# Patient Record
Sex: FEMALE | Race: BLACK OR AFRICAN AMERICAN
[De-identification: names, ages, dates, MRNs, and addresses within clinical notes are randomized per-mention and may not be internally consistent; named-entity substitution may affect disease eponyms.]

---

## 2018-02-22 ENCOUNTER — HOSPITAL ENCOUNTER (INPATIENT)
Dept: HOSPITAL 54 - ER | Age: 73
LOS: 1 days | Discharge: HOME | DRG: 111 | End: 2018-02-23
Attending: INTERNAL MEDICINE | Admitting: INTERNAL MEDICINE
Payer: COMMERCIAL

## 2018-02-22 VITALS — WEIGHT: 133 LBS | HEIGHT: 60.6 IN | BODY MASS INDEX: 25.44 KG/M2

## 2018-02-22 DIAGNOSIS — Z85.038: ICD-10-CM

## 2018-02-22 DIAGNOSIS — I70.0: ICD-10-CM

## 2018-02-22 DIAGNOSIS — Z79.82: ICD-10-CM

## 2018-02-22 DIAGNOSIS — H81.10: Primary | ICD-10-CM

## 2018-02-22 DIAGNOSIS — Z88.5: ICD-10-CM

## 2018-02-22 DIAGNOSIS — Z88.6: ICD-10-CM

## 2018-02-22 DIAGNOSIS — Z98.890: ICD-10-CM

## 2018-02-22 DIAGNOSIS — Z91.041: ICD-10-CM

## 2018-02-22 DIAGNOSIS — Z93.3: ICD-10-CM

## 2018-02-22 LAB
ALBUMIN SERPL BCP-MCNC: 3.9 G/DL (ref 3.4–5)
ALP SERPL-CCNC: 114 U/L (ref 46–116)
ALT SERPL W P-5'-P-CCNC: 38 U/L (ref 12–78)
APTT PPP: 25 SEC (ref 23–34)
AST SERPL W P-5'-P-CCNC: 24 U/L (ref 15–37)
BASOPHILS # BLD AUTO: 0.1 /CMM (ref 0–0.2)
BASOPHILS NFR BLD AUTO: 1.1 % (ref 0–2)
BILIRUB DIRECT SERPL-MCNC: 0.1 MG/DL (ref 0–0.2)
BILIRUB SERPL-MCNC: 0.4 MG/DL (ref 0.2–1)
BUN SERPL-MCNC: 11 MG/DL (ref 7–18)
CALCIUM SERPL-MCNC: 9.8 MG/DL (ref 8.5–10.1)
CHLORIDE SERPL-SCNC: 105 MMOL/L (ref 98–107)
CO2 SERPL-SCNC: 28 MMOL/L (ref 21–32)
CREAT SERPL-MCNC: 0.9 MG/DL (ref 0.6–1.3)
EOSINOPHIL # BLD AUTO: 0 /CMM (ref 0–0.7)
EOSINOPHIL NFR BLD AUTO: 0.7 % (ref 0–6)
GLUCOSE SERPL-MCNC: 87 MG/DL (ref 74–106)
HCT VFR BLD AUTO: 37 % (ref 33–45)
HGB BLD-MCNC: 12.3 G/DL (ref 11.5–14.8)
INR PPP: 0.91 (ref 0.87–1.13)
LYMPHOCYTES NFR BLD AUTO: 3.3 /CMM (ref 0.8–4.8)
LYMPHOCYTES NFR BLD AUTO: 46 % (ref 20–44)
MCH RBC QN AUTO: 31 PG (ref 26–33)
MCHC RBC AUTO-ENTMCNC: 34 G/DL (ref 31–36)
MCV RBC AUTO: 94 FL (ref 82–100)
MONOCYTES NFR BLD AUTO: 0.3 /CMM (ref 0.1–1.3)
MONOCYTES NFR BLD AUTO: 4.6 % (ref 2–12)
NEUTROPHILS # BLD AUTO: 3.4 /CMM (ref 1.8–8.9)
NEUTROPHILS NFR BLD AUTO: 47.6 % (ref 43–81)
PLATELET # BLD AUTO: 287 /CMM (ref 150–450)
POTASSIUM SERPL-SCNC: 3.9 MMOL/L (ref 3.5–5.1)
PROT SERPL-MCNC: 7.6 G/DL (ref 6.4–8.2)
RBC # BLD AUTO: 3.9 MIL/UL (ref 4–5.2)
RDW COEFFICIENT OF VARIATION: 11.6 (ref 11.5–15)
SODIUM SERPL-SCNC: 143 MMOL/L (ref 136–145)
TROPONIN I SERPL-MCNC: < 0.017 NG/ML (ref 0–0.06)
WBC NRBC COR # BLD AUTO: 7.1 K/UL (ref 4.3–11)

## 2018-02-22 PROCEDURE — A4606 OXYGEN PROBE USED W OXIMETER: HCPCS

## 2018-02-22 PROCEDURE — Z7610: HCPCS

## 2018-02-22 NOTE — NUR
BIBSELF PT C/O DIZZINESS X 2 DAYS. CAREGIVER AT BEDSIDE. A/OX4 VSS NAD ABLE TO 
MAKE NEEDS KNOWN. NO SOB OR PAIN NOTED AT THIS TIME. WILL CONTINUE TO MONITOR 
FOR ANY CHANGES DURING THIS TIME. HAS FELT SOME N/V AS WELL OVER THE PAST 2 
DAYS

## 2018-02-23 VITALS — DIASTOLIC BLOOD PRESSURE: 63 MMHG | SYSTOLIC BLOOD PRESSURE: 123 MMHG

## 2018-02-23 VITALS — SYSTOLIC BLOOD PRESSURE: 125 MMHG | DIASTOLIC BLOOD PRESSURE: 62 MMHG

## 2018-02-23 VITALS — SYSTOLIC BLOOD PRESSURE: 126 MMHG | DIASTOLIC BLOOD PRESSURE: 63 MMHG

## 2018-02-23 VITALS — DIASTOLIC BLOOD PRESSURE: 75 MMHG | SYSTOLIC BLOOD PRESSURE: 124 MMHG

## 2018-02-23 VITALS — SYSTOLIC BLOOD PRESSURE: 124 MMHG | DIASTOLIC BLOOD PRESSURE: 69 MMHG

## 2018-02-23 VITALS — SYSTOLIC BLOOD PRESSURE: 126 MMHG | DIASTOLIC BLOOD PRESSURE: 71 MMHG

## 2018-02-23 LAB
BASOPHILS # BLD AUTO: 0 /CMM (ref 0–0.2)
BASOPHILS NFR BLD AUTO: 0.4 % (ref 0–2)
BUN SERPL-MCNC: 9 MG/DL (ref 7–18)
CALCIUM SERPL-MCNC: 9 MG/DL (ref 8.5–10.1)
CHLORIDE SERPL-SCNC: 108 MMOL/L (ref 98–107)
CHOLEST SERPL-MCNC: 169 MG/DL (ref ?–200)
CO2 SERPL-SCNC: 29 MMOL/L (ref 21–32)
CREAT SERPL-MCNC: 0.8 MG/DL (ref 0.6–1.3)
EOSINOPHIL # BLD AUTO: 0.1 /CMM (ref 0–0.7)
EOSINOPHIL NFR BLD AUTO: 1 % (ref 0–6)
GLUCOSE SERPL-MCNC: 111 MG/DL (ref 74–106)
HCT VFR BLD AUTO: 33 % (ref 33–45)
HDLC SERPL-MCNC: 76 MG/DL (ref 40–60)
HGB BLD-MCNC: 11.2 G/DL (ref 11.5–14.8)
LDLC SERPL DIRECT ASSAY-MCNC: 86 MG/DL (ref 0–99)
LYMPHOCYTES NFR BLD AUTO: 3.9 /CMM (ref 0.8–4.8)
LYMPHOCYTES NFR BLD AUTO: 52.9 % (ref 20–44)
MAGNESIUM SERPL-MCNC: 1.9 MG/DL (ref 1.8–2.4)
MCH RBC QN AUTO: 32 PG (ref 26–33)
MCHC RBC AUTO-ENTMCNC: 34 G/DL (ref 31–36)
MCV RBC AUTO: 96 FL (ref 82–100)
MONOCYTES NFR BLD AUTO: 0.6 /CMM (ref 0.1–1.3)
MONOCYTES NFR BLD AUTO: 7.9 % (ref 2–12)
NEUTROPHILS # BLD AUTO: 2.8 /CMM (ref 1.8–8.9)
NEUTROPHILS NFR BLD AUTO: 37.8 % (ref 43–81)
PHOSPHATE SERPL-MCNC: 3 MG/DL (ref 2.5–4.9)
PLATELET # BLD AUTO: 267 /CMM (ref 150–450)
POTASSIUM SERPL-SCNC: 3.4 MMOL/L (ref 3.5–5.1)
RBC # BLD AUTO: 3.49 MIL/UL (ref 4–5.2)
RDW COEFFICIENT OF VARIATION: 12.1 (ref 11.5–15)
SODIUM SERPL-SCNC: 143 MMOL/L (ref 136–145)
TRIGL SERPL-MCNC: 61 MG/DL (ref 30–150)
TSH SERPL DL<=0.005 MIU/L-ACNC: 4.5 UIU/ML (ref 0.36–3.74)
WBC NRBC COR # BLD AUTO: 7.3 K/UL (ref 4.3–11)

## 2018-02-23 NOTE — NUR
Social service consult requested by BRAD Menard for possible homelessness. Pt. is a 73 
year old female who was admitted to [facility] for intractable dizziness. SAPPHIRE and  
Khai met with pt. bedside to discuss discharge plan. Pt. states she is currently homeless 
and has a friend /caregiver named Sam, who was also bedside. Sam is her emergency 
contact and can be reached at [phone]. Pt. is complaining about abdominal pain, 
however is refusing to get CT scan done. Pt. had insisted on being admitted last night in 
the ED.  Pt. is non-compliant with her medical care. Pt. has a colostomy bag. Pt. has been 
in remission from Colon CA for the past four years. Pt. is confrontational, manipulative and 
continued to ask SW for emergency housing vouchers. SW explained to pt. that [facility] is a 
private hospital and emergency housing vouchers are only given at [facility] hospitals. Earlier 
 had spoken to pt. and her friend Sam who disclosed to him that they have been 
to several [facility] hospitals including [facility]. However, when SAPPHIRE informed them of 
[facility], they were surprised and stated they have never been there. SAPPHIRE gave pt. 
homeless shelter and other homeless resources, food resources, medical health clinics 
referrals, list of senior citizen resources & Cancer hospital referrals for [facility].

## 2018-02-23 NOTE — NUR
TELE RN NOTES

PATIENT DC ORDERS GIVEN BY DR RIVERO, ALL DISCHARGE PAPERWORK DONE, DISCHARGE TEACHING 
DONE, RESOURCES PROVIDED TO PATIENT ABOUT OPTIONS AVAILABLE TO HER DUE TO HOMELESSNESS. 
PATIENT WAS GIVEN LIST OF HOSPITALS SUCH AS [facility] TO FOLLOW-UP ON HER PAST CANCER 
HISTORY. PATIENT VERBALIZED UNDERSTANDING AND IS WAS OFFERED TAXI VOUCHER FOR 
TRANSPORTATION. IV DC AND BELONGINGS LIST SIGNED, NO SKIN ISSUES,  PATIENTS FRIEND/ 
CAREGIVER AT BEDSIDE.

## 2018-02-23 NOTE — NUR
TELE RN CLOSING NOTE



PT REMAINED STABLE DURING SIFT. NO ACUTE DISTRESS NOTED. ALL NEEDS ATTENDED TO PROMPTLY. 
CALL LIGHT WITHIN REACH. WILL ENDORSE TO NEXT SHIFT FOR CONTINUITY OF CARE.

## 2018-02-23 NOTE — NUR
TELE RN INITIAL NOTES

RECEIVED PATIENT AWAKE IN BED, AOX3, NO SIGNS OF DISTRESS ON ROOM, AIR, NSR ON TELE MONITOR, 
NO CO OF DIZZINESS AT THIS TIME, AMBULATORY L HAND 20G CLEAN AND PATENT INFUSING NS @ 75 
ML/HR, COLOSTOMY BAG IN PLACE.  BED IN LOW AND LOCKED POSITION, CALL LIGHT WITHIN REACH. 
WILL CONTINUE TO MONITOR.

## 2018-02-23 NOTE — NUR
TELE RN INITIAL NOTE



PT WALKED TO BED FROM [facility]. A/O X4 AND ABLE TO VERBALIZE NEEDS. FRIEND AT BEDSIDE. ON ROOM 
AIR AND BREATHING REGULAR/ UNLABORED. IV L HAND #20 CLEAN, DRY AND FLUSHING WELL. CALL LIGHT 
WITHIN REACH. WILL CONTINUE TO MONITOR.

## 2018-02-23 NOTE — NUR
TELE RN NOTES

PATIENT SEEN BY DR. RAMOS STATES SHE WILL THINK ABOUT GETTING CT OF THE HEAD, REFUSING AT 
THIS TIME. PATIENT SEEN BY DR RAMOS.

## 2018-02-23 NOTE — NUR
TELE RN NOTES 

PATIENT DC WITH TAXI VOUCHER, TAKEN TO LOBBY VIA WHEELCHAIR AND CAREGIVER STEWART. PATIENT TO 
BE TAKEN TO [facility] IN [place].